# Patient Record
Sex: MALE | Race: BLACK OR AFRICAN AMERICAN | Employment: STUDENT | URBAN - METROPOLITAN AREA
[De-identification: names, ages, dates, MRNs, and addresses within clinical notes are randomized per-mention and may not be internally consistent; named-entity substitution may affect disease eponyms.]

---
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
## 2020-02-12 ENCOUNTER — HOSPITAL ENCOUNTER (OUTPATIENT)
Dept: PHYSICAL THERAPY | Age: 20
End: 2020-02-12
Payer: OTHER GOVERNMENT

## 2020-02-18 ENCOUNTER — HOSPITAL ENCOUNTER (OUTPATIENT)
Dept: PHYSICAL THERAPY | Age: 20
Discharge: HOME | End: 2020-02-18
Payer: OTHER GOVERNMENT

## 2020-02-18 PROCEDURE — 97140 MANUAL THERAPY 1/> REGIONS: CPT

## 2020-02-18 PROCEDURE — 97161 PT EVAL LOW COMPLEX 20 MIN: CPT

## 2020-02-18 PROCEDURE — 97110 THERAPEUTIC EXERCISES: CPT

## 2020-02-18 NOTE — PROGRESS NOTES
PT DAILY TREATMENT NOTE    Patient Name: Leila Mcleod  Date:2020  : 2000  [x]  Patient  Verified  Payor: [employer] / Plan: [facility] DEPENDENTS / Product Type: [address] [place] [address] /    In time:10:10  Out time:10:55  Total Treatment Time (min): 45  Total Timed Codes (min): 25  1:1 Treatment Time ( [address] only): 25  Visit #: 1 of 12    Treatment Area: Right shoulder pain [M25.511]  Right knee pain [M25.561]    SUBJECTIVE  Pain Level (0-10 scale): 0  Any medication changes, allergies to medications, adverse drug reactions, diagnosis change, or new procedure performed?: [x] No    [] Yes (see summary sheet for update)  Subjective functional status/changes:   [] No changes reported  Patient is a 23 y.o. Male who presents to [facility] with a dx of right knee and shoulder pain. Pt reports symptoms began 2019 ago after fall long boarding reporting [address] injury. Pt is a current student at [employer] and is trying out for football. Pt has a long-standing history of knee pain, but reports it has re-emerged after this fall. Pt denies clunking or clicking and has not had any imaging done. Symptoms are worsened by overhead movements and alleviated by rest.  Average reported pain levels are 6/10. Pt would benefit from skilled PT to address her objective and functional limitations to improve ROM, strength, posture, and decrease pain to improve his ability to return to pain free sports at school. OBJECTIVE    Modalities Rationale:     decrease edema, decrease inflammation and decrease pain to improve patient's ability to stand for prolonged periods.     min [] Estim, type/location:                                      []  att     []  unatt     []  w/US     []  w/ice    []  w/heat    min []  Mechanical Traction: type/lbs                   []  pro   []  sup   []  int   []  cont    []  before manual    []  after manual    min []  Ultrasound, settings/location:      min [] Iontophoresis w/ dexamethasone, location:                                               []  take home patch       []  in clinic    min []  Ice     []  Heat    location/position:     min []  Vasopneumatic Device, press/temp:     min []  Other:    [] Skin assessment post-treatment (if applicable):    []  intact    []  redness- no adverse reaction     []redness - adverse reaction:        20 min []Eval                  []Re-Eval       15 min Therapeutic Exercise:  [] See flow sheet :   Rationale: increase ROM and increase strength to improve the patients ability to stand for prolonged periods. 10 min Manual Therapy:  Apical expansion technique 1 person   Rationale: decrease pain, increase ROM and increase tissue extensibility to improve the patients ability to stand prolonged periods. With   [x] TE   [] TA   [] neuro   [] other: Patient Education: [x] Review HEP    [] Progressed/Changed HEP based on:   [] positioning   [] body mechanics   [] transfers   [] heat/ice application    [] other:      Other Objective/Functional Measures: ROM     Right  Left    Knee flex  120  130   Knee ext 0 0   Ankle DF  6 deg  10 deg    Shoulder flex  180  180   Shoulder abd  180 with pain  180   Shoulder IR L5 L5   Shoulder ER  T2  T2         MMT     Right Left    Shoulder flex/abd  4 pain with shoulder flex  5   triceps  4 pain  5   biceps  4+  5   serratus  4 pain  5   QP            Right Left    Hip flexion  4  5   Hip abd  4  5   Hip add  4  5   Ankle DF       Bridge: very challenged with low height bridging          + HGIR bilaterally + apical expansion on left.    - neers   - anterior apprehension test       Running mechanics:  Poor arm swing with right UE with running   Throwing mechanics: hesitation with end range ER      Good joint mobility 3/6 all directions bilateral GH joint      Pain Level (0-10 scale) post treatment: 0    ASSESSMENT/Changes in Function: Pt has s/sx consistent with mechanical shoulder and knee pain.      Patient will continue to benefit from skilled PT services to modify and progress therapeutic interventions, address functional mobility deficits, address ROM deficits and address strength deficits to attain remaining goals. []  See Plan of Care  []  See progress note/recertification  []  See Discharge Summary         Progress towards goals / Updated goals:  Short term goals   1. Patient to be (I) and compliant with Initial HEP to prevent further disability. Eval: dispensed PPT, bridge, QP SA     2. Patient to improve FOTO scores by 7 points to improve functional tolerance.    Eval:      3. Pt to improve knee flexion ROM to WFL to improve functional tolerance.     Eval:right knee flexion 120 deg left knee flexion 130 deg      Long term Goals  1. Pt to be (I) and compliant with progressive HEP in preparation for D/C. Eval: n/a      2. Pt to increase football tolerance for 4 hours to improve functional tolerance. Eval: unable to play secondary to pain and fear of re-injury, but is training.      3. Pt to perform 10 full height bridges to improve lifting tolerance   Eval: very challenged with PPT and low height bridging. PLAN  []  Upgrade activities as tolerated     [x]  Continue plan of care  []  Update interventions per flow sheet       []  Discharge due to:_  []  Other:_      Danna Mathews, PT 2/18/2020  10:35 AM    No future appointments.

## 2020-02-18 NOTE — PROGRESS NOTES
[facility] at the [address], [place], [address] [facility]  Phone: [phone]      Fax:  [phone]       Plan of Care/ Statement of Necessity for Physical Therapy Services      Patient name: Wilmon Brittle Start of Care: 2020   Referral source: Percy Nielsen NP : 2000    Medical Diagnosis: Right shoulder pain [M25.511]  Right knee pain [M25.561]   Onset Date:acute   Treatment Diagnosis: Right shoulder pain [M25.511]  Right knee pain [M25.561]   Prior Hospitalization: see medical history Provider#: [id number]   Medications: Verified on Patient summary List    Comorbidities:  prior bilateral knee ACL repairs   Prior Level of Function: no shoulder pain with functional activities,hx of knee pain but resolved with conservative care. Patient is a 23 y.o. Male who presents to [facility] with a dx of right knee and shoulder pain. Pt reports symptoms began 2019 ago after fall long boarding reporting [address] injury. 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 Pt has a long-standing history of knee pain, but reports it has re-emerged after this fall. Pt denies clunking or clicking and has not had any imaging done. Symptoms are worsened by overhead movements and alleviated by rest.  Average reported pain levels are 6/10. Pt would benefit from skilled PT to address her objective and functional limitations to improve ROM, strength, posture, and decrease pain to improve his ability to return to pain free sports at school.       ROM     Right  Left    Knee flex  120  130   Knee ext 0 0   Ankle DF  6 deg  10 deg    Shoulder flex  180  180   Shoulder abd  180 with pain  180   Shoulder IR L5 L5   Shoulder ER  T2  T2         MMT     Right Left    Shoulder flex/abd  4 pain with shoulder flex  5   triceps  4 pain  5   biceps  4+  5   serratus  4 pain  5   QP            Right Left    Hip flexion  4  5   Hip abd  4  5 Hip add  4  5   Ankle DF       Bridge: very challenged with low height bridging         + HGIR bilaterally + apical expansion on left. - neers   - anterior apprehension test      Running mechanics:  Poor arm swing with right UE with running   Throwing mechanics: hesitation with end range ER     Good joint mobility 3/6 all directions bilateral GH joint      The Plan of Care and following information is based on the information from the initial evaluation. Assessment/ key information: Pt has s/sx consistent with mechanical shoulder and knee pain. Eval Complexity: History: MEDIUM  Complexity : 1-2 comorbidities / personal factors will impact the outcome/ POC Exam:HIGH Complexity : 4+ Standardized tests and measures addressing body structure, function, activity limitation and / or participation in recreation  Presentation: MEDIUM Complexity : Evolving with changing characteristics  Clinical Decision Making:MEDIUM Complexity : FOTO score of 26-74Overall Complexity:MEDIUM  Problem List: pain affecting function, decrease ROM, decrease strength, impaired gait/ balance, decrease ADL/ functional abilitiies and decrease activity tolerance            Treatment Plan may include any combination of the following: Therapeutic exercise, Therapeutic activities, Neuromuscular re-education, Physical agent/modality, Gait/balance training, Manual therapy and Patient education  Patient / Family readiness to learn indicated by: asking questions and interest  Persons(s) to be included in education: patient (P)  Barriers to Learning/Limitations: None  Measures taken:     Patient Goal (s): \"get back to pain-free football\"   Patient self reported health status: good  Rehabilitation Potential: good  · Short Term Goals: To be accomplished in  3  weeks:  Short term goals   1. Patient to be (I) and compliant with Initial HEP to prevent further disability. Eval: dispensed PPT, bridge, QP SA    2.  Patient to improve FOTO scores by 7 points to improve functional tolerance.    Eval:     3. Pt to improve knee flexion ROM to WFL to improve functional tolerance.     Eval:right knee flexion 120 deg left knee flexion 130 deg     Long term Goals  1. Pt to be (I) and compliant with progressive HEP in preparation for D/C. Eval: n/a     2. Pt to increase football tolerance for 4 hours to improve functional tolerance. Eval: unable to play secondary to pain and fear of re-injury, but is training. 3. Pt to perform 10 full height bridges to improve lifting tolerance   Eval: very challenged with PPT and low height bridging. Frequency / Duration: Patient to be seen 2 times per week for 6 weeks. Patient/ Caregiver education and instruction: Diagnosis, prognosis, activity modification and exercises   [x]  Plan of care has been reviewed with RUFINO Bryant PT 2/18/2020 9:55 AM  _____________________________________________________________________  I certify that the above Therapy Services are being furnished while the patient is under my care. I agree with the treatment plan and certify that this therapy is necessary.     Physician's Signature:____________Date:_________TIME:________    [employer], Date and Time must be completed for valid certification **    Please sign and return to [facility] at the [address], [address] [facility]       Phone: [phone]      Fax:  [phone]

## 2020-02-19 ENCOUNTER — HOSPITAL ENCOUNTER (OUTPATIENT)
Dept: PHYSICAL THERAPY | Age: 20
Discharge: HOME | End: 2020-02-19
Payer: OTHER GOVERNMENT

## 2020-02-19 PROCEDURE — 97530 THERAPEUTIC ACTIVITIES: CPT

## 2020-02-19 PROCEDURE — 97110 THERAPEUTIC EXERCISES: CPT

## 2020-02-19 NOTE — PROGRESS NOTES
PT DAILY TREATMENT NOTE    Patient Name: Avila Bird  Date:2020  : 2000  [x]  Patient  Verified  Payor:  / Plan: Rakesh Silver / Product Type:  /    In time:12:58  Out time:2:05  Total Treatment Time (min): 67  Total Timed Codes (min): 67  1:1 Treatment Time ( [address] only): 79   Visit #: 2 of 12    Treatment Area: Right shoulder pain [M25.511]  Right knee pain [M25.561]    SUBJECTIVE  Pain Level (0-10 scale): 0  Any medication changes, allergies to medications, adverse drug reactions, diagnosis change, or new procedure performed?: [x] No    [] Yes (see summary sheet for update)  Subjective functional status/changes:   [] No changes reported  \"Good. No pain today. \"    OBJECTIVE    52 min Therapeutic Exercise:  [x] See flow sheet :   Rationale: increase ROM and increase strength to improve the patients ability to perform daily activities with decreased pain and symptom levels    15 min Therapeutic Activity:  [x]  See flow sheet :   Rationale: increase strength, improve coordination and increase proprioception  to improve the patients ability to perform daily activities with decreased pain and symptom levels     With   [] TE   [] TA   [] neuro   [] other: Patient Education: [x] Review HEP    [] Progressed/Changed HEP based on:   [] positioning   [] body mechanics   [] transfers   [] heat/ice application    [] other:      Other Objective/Functional Measures:   Decreased pain with PPT and decreasing rib flare with exercises     Pain Level (0-10 scale) post treatment: 0    ASSESSMENT/Changes in Function: Pt able to complete all exercises without pain with cues for proper form. Fatigued with clams, 90/90 and QP.     Patient will continue to benefit from skilled PT services to modify and progress therapeutic interventions, address functional mobility deficits, address ROM deficits, address strength deficits, analyze and cue movement patterns, analyze and modify body mechanics/ergonomics, assess and modify postural abnormalities and instruct in home and community integration to attain remaining goals. []  See Plan of Care  []  See progress note/recertification  []  See Discharge Summary         Progress towards goals / Updated goals:  Short Term Goals: To be accomplished in  3  weeks:   1. Patient to be (I) and compliant with Initial HEP to prevent further disability. Eval: dispensed PPT, bridge, QP SA  Current: compliance per pt report      2. Patient to improve FOTO scores by 7 points to improve functional tolerance.    Eval: 71     3. Pt to improve knee flexion ROM to WFL to improve functional tolerance.     Eval:right knee flexion 120 deg left knee flexion 130 deg      Long term Goals  1. Pt to be (I) and compliant with progressive HEP in preparation for D/C. Eval: n/a      2. Pt to increase football tolerance for 4 hours to improve functional tolerance. Eval: unable to play secondary to pain and fear of re-injury, but is training.      3.  Pt to perform 10 full height bridges to improve lifting tolerance   Eval: very challenged with PPT and low height bridging.        PLAN  [x]  Upgrade activities as tolerated     [x]  Continue plan of care  []  Update interventions per flow sheet       []  Discharge due to:_  []  Other:_      Honey Grissom 2/19/2020  2:14 PM    Future Appointments   Date Time Provider Isael Murphy   2/26/2020 11:30 AM AMY Mackey [facility]   2/28/2020 11:45 AM AMY Centeno [facility]   3/2/2020  9:30 AM AMY MackeyHPSLOANE [facility]   3/5/2020 11:15 AM AMY MackeyHPSLOANE [facility]   3/9/2020 11:45 AM AMY Centeno THE [facility]   3/11/2020 11:30 AM AMY MackeyHPSLOANE [facility]

## 2020-02-26 ENCOUNTER — HOSPITAL ENCOUNTER (OUTPATIENT)
Dept: PHYSICAL THERAPY | Age: 20
Discharge: HOME | End: 2020-02-26
Payer: OTHER GOVERNMENT

## 2020-02-26 PROCEDURE — 97112 NEUROMUSCULAR REEDUCATION: CPT

## 2020-02-26 PROCEDURE — 97110 THERAPEUTIC EXERCISES: CPT

## 2020-02-26 NOTE — PROGRESS NOTES
PT DAILY TREATMENT NOTE    Patient Name: Avila Pool Pickup  Date:2020  : 2000  [x]  Patient  Verified  Payor: CADEN / Plan: Marcellus Goode 74 / Product Type:  /    In time:1136  Out time:1220  Total Treatment Time (min): 44  Total Timed Codes (min): 44  1:1 Treatment Time (MC/BCBS only): n/a    Visit #: 3 of 12    Treatment Area: Right shoulder pain [M25.511]  Right knee pain [M25.561]    SUBJECTIVE  Pain Level (0-10 scale): 0  Any medication changes, allergies to medications, adverse drug reactions, diagnosis change, or new procedure performed?: [x] No    [] Yes (see summary sheet for update)  Subjective functional status/changes:   [] No changes reported  Reports that shoulder and knee pain are almost resolved, still occasional shoulder discomfort with overhead lifting and mild knee pain after recent 5 mile run    OBJECTIVE      14 min Therapeutic Exercise:  [x] See flow sheet :   Rationale: increase ROM and increase strength to improve the patients ability to return to PLOF    30 min Neuromuscular Re-education:  [x]  See flow sheet :progression of MURRAY ex for improved rib cage mobility/symmetry and more optimal extremity function   Rationale: increase ROM, increase strength and increase proprioception  to improve the patients ability to return to PLOF without pain           With   [] TE   [] TA   [x] neuro   [] other: Patient Education: [x] Review HEP    [] Progressed/Changed HEP based on:   [] positioning   [] body mechanics   [] transfers   [] heat/ice application    [] other:      Other Objective/Functional Measures: deep squat performed with external support and foam under heels due to deficit in ankle mobility/flexibility     Pain Level (0-10 scale) post treatment: 0    ASSESSMENT/Changes in Function: patient challenged with advanced activities but able to perform without reports of pain or discomfort    Patient will continue to benefit from skilled PT services to address ROM deficits, address strength deficits and analyze and address soft tissue restrictions to attain remaining goals. [x]  See Plan of Care      Progress towards goals / Updated goals:  Short Term Goals: To be accomplished in  3  weeks:   1. Patient to be (I) and compliant with Initial HEP to prevent further disability.   Eval: dispensed PPT, bridge, QP SA  Current: compliance per pt report      2. Patient to improve FOTO scores by 7 points to improve functional tolerance.    Eval: 71  Current status: to be tested on visit #5     3. Pt to improve knee flexion ROM to WFL to improve functional tolerance.     Eval:right knee flexion 120 deg left knee flexion 130 deg  Status on 2/26/20: good functional knee mobility without c/o pain, progressing      Long term Goals  1. Pt to be (I) and compliant with progressive HEP in preparation for D/C.   Eval: n/a      2. Pt to increase football tolerance for 4 hours to improve functional tolerance.    Eval: unable to play secondary to pain and fear of re-injury, but is training.      3.  Pt to perform 10 full height bridges to improve lifting tolerance   Eval: very challenged with PPT and low height bridging.        PLAN  []  Upgrade activities as tolerated     []  Continue plan of care  []  Update interventions per flow sheet       []  Discharge due to:_  []  Other:_      Pk Lucero, PT 2/26/2020  12:33 PM    Future Appointments   Date Time Provider Isael Murphy   2/28/2020 11:45 AM AMY Sharma [facility]   3/2/2020  9:30 AM AMY Smith [facility]   3/5/2020 11:15 AM AMY SmithHPSLOANE [facility]   3/9/2020 11:45 AM AMY Sharma [facility]   3/11/2020 11:30 AM AMY SmithHPSLOANE [facility]

## 2020-02-28 ENCOUNTER — HOSPITAL ENCOUNTER (OUTPATIENT)
Dept: PHYSICAL THERAPY | Age: 20
Discharge: HOME | End: 2020-02-28
Payer: OTHER GOVERNMENT

## 2020-02-28 PROCEDURE — 97110 THERAPEUTIC EXERCISES: CPT

## 2020-02-28 NOTE — PROGRESS NOTES
PT DAILY TREATMENT NOTE    Patient Name: Avila De Los Santos  Date:2020  : 2000  [x]  Patient  Verified  Payor:  / Plan: Marcellus Goode 74 / Product Type:  /    In time:8:40  Out time:9:40  Total Treatment Time (min): 60  Total Timed Codes (min): 60  1:1 Treatment Time ( [address] only): 60   Visit #: 4 of 12    Treatment Area: Right shoulder pain [M25.511]  Right knee pain [M25.561]    SUBJECTIVE  Pain Level (0-10 scale): 0  Any medication changes, allergies to medications, adverse drug reactions, diagnosis change, or new procedure performed?: [x] No    [] Yes (see summary sheet for update)  Subjective functional status/changes:   [] No changes reported  \"I was sore in my calf and HS after last visit. \"    OBJECTIVE      60 min Therapeutic Exercise:  [x] See flow sheet :   Rationale: increase ROM and increase strength to improve the patients ability to perform daily activities with decreased pain and symptom levels          With   [] TE   [] TA   [] neuro   [] other: Patient Education: [x] Review HEP    [] Progressed/Changed HEP based on:   [] positioning   [] body mechanics   [] transfers   [] heat/ice application    [] other:      Other Objective/Functional Measures:   Improved right knee flexion AROM  valgus and instability with SL squat to table  Very fatigued with HS curls with slideboard      Pain Level (0-10 scale) post treatment: 0    ASSESSMENT/Changes in Function: Pt appropriately challenged with exercises with no pain only fatigue in shoulder and knee. Most challenged with SL squat on right due to decreased functional HS and glut strength as well as deficit ACL and increased valgus needing TB to correct. Fatigued in QP however able to maintain good form today.      Patient will continue to benefit from skilled PT services to modify and progress therapeutic interventions, address functional mobility deficits, address ROM deficits, address strength deficits, analyze and cue movement patterns, analyze and modify body mechanics/ergonomics, assess and modify postural abnormalities and instruct in home and community integration to attain remaining goals. []  See Plan of Care  []  See progress note/recertification  []  See Discharge Summary         Progress towards goals / Updated goals:  Short Term Goals: To be accomplished in  3  weeks:   1. Patient to be (I) and compliant with Initial HEP to prevent further disability.   Eval: dispensed PPT, bridge, QP SA  Current: compliance per pt report      2. Patient to improve FOTO scores by 7 points to improve functional tolerance.    Eval: 71  Current status: to be tested on visit #5     3. Pt to improve knee flexion ROM to WFL to improve functional tolerance.     Eval:right knee flexion 120 deg left knee flexion 130 deg  Current: 125* right knee flexion AROMprogressing 2/28/20     Long term Goals  1. Pt to be (I) and compliant with progressive HEP in preparation for D/C.   Eval: n/a   Current:      2. Pt to increase football tolerance for 4 hours to improve functional tolerance.    Eval: unable to play secondary to pain and fear of re-injury, but is training.   Current:      3.  Pt to perform 10 full height bridges to improve lifting tolerance   Eval: very challenged with PPT and low height bridging  Current:          PLAN  [x]  Upgrade activities as tolerated     [x]  Continue plan of care  []  Update interventions per flow sheet       []  Discharge due to:_  []  Other:_      Jasmin Gamble 2/28/2020  8:53 AM    Future Appointments   Date Time Provider Isael Murphy   3/2/2020  9:30 AM Jairo Butt PT [facility]   3/5/2020 11:15 AM Jairo Butt PT [facility]   3/9/2020 11:45 AM Olinda Nye PT [facility]   3/11/2020 11:30 AM Jairo Butt PT [facility]

## 2020-03-02 ENCOUNTER — HOSPITAL ENCOUNTER (OUTPATIENT)
Dept: PHYSICAL THERAPY | Age: 20
End: 2020-03-02
Payer: OTHER GOVERNMENT

## 2020-03-04 ENCOUNTER — HOSPITAL ENCOUNTER (OUTPATIENT)
Dept: PHYSICAL THERAPY | Age: 20
Discharge: HOME | End: 2020-03-04
Payer: OTHER GOVERNMENT

## 2020-03-04 PROCEDURE — 97112 NEUROMUSCULAR REEDUCATION: CPT

## 2020-03-04 PROCEDURE — 97110 THERAPEUTIC EXERCISES: CPT

## 2020-03-04 NOTE — PROGRESS NOTES
PT DAILY TREATMENT NOTE    Patient Name: Saeed Oh  Date:3/4/2020  : 2000  [x]  Patient  Verified  Payor:  / Plan: [facility]  RETIREES AND DEPENDENTS / Product Type:  /    In time:350  Out time:430  Total Treatment Time (min): 40  Total Timed Codes (min): 40  1:1 Treatment Time (MC/BCBS only): 40    Visit #: 5 of 12    Treatment Area: Right shoulder pain [M25.511]  Right knee pain [M25.561]    SUBJECTIVE  Pain Level (0-10 scale): 0  Any medication changes, allergies to medications, adverse drug reactions, diagnosis change, or new procedure performed?: [x] No    [] Yes (see summary sheet for update)  Subjective functional status/changes:   [] No changes reported  Feeling stronger    OBJECTIVE      15 min Therapeutic Exercise:  [x] See flow sheet :   Rationale: increase ROM and increase strength to improve the patients ability to return to PLOF       30 min Neuromuscular Re-education:  [x]  See flow sheet :progression of MURRAY, core activation   Rationale: increase ROM, increase strength, improve coordination and increase proprioception  to improve the patients ability to return to PLOF              With   [x] TE   [] TA   [] neuro   [] other: Patient Education: [x] Review HEP    [] Progressed/Changed HEP based on:   [] positioning   [] body mechanics   [] transfers   [] heat/ice application    [] other:      Other Objective/Functional Measures: Good tolerance to all activities, residual tightness both hamstrings       Pain Level (0-10 scale) post treatment: 0    ASSESSMENT/Changes in Function: hamstring tightness, no pain with activities    Patient will continue to benefit from skilled PT services to address strength deficits and analyze and cue movement patterns to attain remaining goals. [x]  See Plan of Care  []  See progress note/recertification  []  See Discharge Summary         Progress towards goals / Updated goals:  Short Term Goals: To be accomplished in  3  weeks:   1. Patient to be (I) and compliant with Initial HEP to prevent further disability.   Eval: dispensed PPT, bridge, QP SA  Current: compliance per pt report      2. Patient to improve FOTO scores by 7 points to improve functional tolerance.    Eval: 71  Current status: 3/4/20: FOTO 99, goal met     3. Pt to improve knee flexion ROM to WFL to improve functional tolerance.     Eval:right knee flexion 120 deg left knee flexion 130 deg  Current: 125* right knee flexion AROM, progressing 2/28/20     Long term Goals  1. Pt to be (I) and compliant with progressive HEP in preparation for D/C.   Eval: n/a   Current:compiant with current HEP      2. Pt to increase football tolerance for 4 hours to improve functional tolerance.    Eval: unable to play secondary to pain and fear of re-injury, but is training.   Current: football not initiated yet, starting mid April     3.  Pt to perform 10 full height bridges to improve lifting tolerance   Eval: very challenged with PPT and low height bridging  Current:improved performance with full height bridge, progressing       PLAN  []  Upgrade activities as tolerated     [x]  Continue plan of care  []  Update interventions per flow sheet       []  Discharge due to:_  []  Other:_      Scarlett Womack, PT 3/4/2020  4:14 PM    Future Appointments   Date Time Provider Isael Murphy   3/5/2020 11:15 AM Medhat Contreras PT [facility]   3/9/2020 11:45 AM AMY Ashford [facility]   3/11/2020 11:30 AM AMY Willis [facility]

## 2020-03-05 ENCOUNTER — APPOINTMENT (OUTPATIENT)
Dept: PHYSICAL THERAPY | Age: 20
End: 2020-03-05
Payer: OTHER GOVERNMENT

## 2020-03-09 ENCOUNTER — HOSPITAL ENCOUNTER (OUTPATIENT)
Dept: PHYSICAL THERAPY | Age: 20
Discharge: HOME | End: 2020-03-09
Payer: OTHER GOVERNMENT

## 2020-03-09 PROCEDURE — 97110 THERAPEUTIC EXERCISES: CPT

## 2020-03-09 PROCEDURE — 97112 NEUROMUSCULAR REEDUCATION: CPT

## 2020-03-09 NOTE — PROGRESS NOTES
PT DAILY TREATMENT NOTE    Patient Name: Stella Maurer  Date:3/9/2020  : 2000  [x]  Patient  Verified  Payor:  / Plan: [employer]  RETIREES AND DEPENDENTS / Product Type: Adonica Nicolas /    In time:10:50  Out time:11:45  Total Treatment Time (min): 55  Total Timed Codes (min): 55  1:1 Treatment Time (MC/BCBS only): n/a   Visit #: 6 of 12    Treatment Area: Right shoulder pain [M25.511]  Right knee pain [M25.561]    SUBJECTIVE  Pain Level (0-10 scale): 0  Any medication changes, allergies to medications, adverse drug reactions, diagnosis change, or new procedure performed?: [x] No    [] Yes (see summary sheet for update)  Subjective functional status/changes:   [] No changes reported  My shoulder hurt lifting at work    OBJECTIVE      25 min Therapeutic Exercise:  [x] See flow sheet :   Rationale: increase ROM and increase strength to improve the patients ability to return to PLOF       30 min Neuromuscular Re-education:  [x]  See flow sheet :progression of MURRAY, core activation   Rationale: increase ROM, increase strength, improve coordination and increase proprioception  to improve the patients ability to return to PLOF              With   [x] TE   [] TA   [] neuro   [] other: Patient Education: [x] Review HEP    [] Progressed/Changed HEP based on:   [] positioning   [] body mechanics   [] transfers   [] heat/ice application    [] other:      Other Objective/Functional Measures: challenged with sled push maintaining rib depression     Pain Level (0-10 scale) post treatment: 0    ASSESSMENT/Changes in Function: Pt very challenged with MURRAY squats today, required consistent cueing. Patient will continue to benefit from skilled PT services to address strength deficits and analyze and cue movement patterns to attain remaining goals.      [x]  See Plan of Care  []  See progress note/recertification  []  See Discharge Summary         Progress towards goals / Updated goals:  Short Term Goals: To be accomplished in  3  weeks:   1. Patient to be (I) and compliant with Initial HEP to prevent further disability.   Eval: dispensed PPT, bridge, QP SA  Current: compliance per pt report, MET     2. Patient to improve FOTO scores by 7 points to improve functional tolerance.    Eval: 71  Current status: 3/4/20: FOTO 99, goal met      3. Pt to improve knee flexion ROM to WFL to improve functional tolerance.     Eval:right knee flexion 120 deg left knee flexion 130 deg  Current: 125* right knee flexion AROM, progressing 2/28/20     Long term Goals  1. Pt to be (I) and compliant with progressive HEP in preparation for D/C.   Eval: n/a   Current:compiant with current HEP      2. Pt to increase football tolerance for 4 hours to improve functional tolerance.    Eval: unable to play secondary to pain and fear of re-injury, but is training.   Current: football not initiated yet, starting mid April     3.  Pt to perform 10 full height bridges to improve lifting tolerance   Eval: very challenged with PPT and low height bridging  Current:improved performance with full height bridge, progressing       PLAN  []  Upgrade activities as tolerated     [x]  Continue plan of care  []  Update interventions per flow sheet       []  Discharge due to:_  []  Other:_      Isabella Winston, PT 3/9/2020  4:14 PM    Future Appointments   Date Time Provider Isael Murphy   3/9/2020 11:45 AM Marcella Patton, PT [facility]   3/11/2020 11:30 AM Harpal Gamble, PT [facility]
